# Patient Record
Sex: MALE | Race: WHITE
[De-identification: names, ages, dates, MRNs, and addresses within clinical notes are randomized per-mention and may not be internally consistent; named-entity substitution may affect disease eponyms.]

---

## 2018-04-28 ENCOUNTER — HOSPITAL ENCOUNTER (EMERGENCY)
Dept: HOSPITAL 62 - ER | Age: 33
LOS: 1 days | Discharge: HOME | End: 2018-04-29
Payer: OTHER GOVERNMENT

## 2018-04-28 VITALS — SYSTOLIC BLOOD PRESSURE: 128 MMHG | DIASTOLIC BLOOD PRESSURE: 66 MMHG

## 2018-04-28 DIAGNOSIS — M79.606: ICD-10-CM

## 2018-04-28 DIAGNOSIS — R20.2: ICD-10-CM

## 2018-04-28 DIAGNOSIS — S39.92XA: Primary | ICD-10-CM
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
## 2018-04-28 PROCEDURE — 99283 EMERGENCY DEPT VISIT LOW MDM: CPT

## 2018-04-28 PROCEDURE — 72110 X-RAY EXAM L-2 SPINE 4/>VWS: CPT

## 2018-04-28 PROCEDURE — 96372 THER/PROPH/DIAG INJ SC/IM: CPT

## 2018-04-29 NOTE — RADIOLOGY REPORT (SQ)
EXAM DESCRIPTION: 



L SPINE WHOLE



CLINICAL HISTORY: 



32 years, Male, back injury



COMPARISON:

None.



NUMBER OF VIEWS:

5



LIMITATIONS:

None.



Findings: Normal alignment and curvature. Vertebral and

intervertebral heights are maintained. Extraspinal structures are

grossly intact.



IMPRESSION: No acute findings of L SPINE.

.

## 2018-04-29 NOTE — ER DOCUMENT REPORT
ED Neck/Back Problem





- General


Mode of Arrival: Ambulatory


Information source: Patient


TRAVEL OUTSIDE OF THE [place] IN LAST 30 DAYS: No





<DOYLE ROOT - Last Filed: 04/29/18 02:07>





<KANDI RYAN - Last Filed: 04/29/18 03:50>





- General


Chief Complaint: Back Injury


Stated Complaint: BACK INJURY


Time Seen by Provider: 04/28/18 23:14


Notes: 





Patient is a 32-year-old male who presents to the emergency department today 

with complaints of lower back pain.  Patient states he was dead lifting 225 

pounds when his "low back and legs felt like jelly".  Patient states he has 

tingling in bilateral hips and arms.  Patient states she took Tylenol prior to 

arrival which did not relieve his pain.  Patient has no difficulty with 

ambulation.  Patient denies any incontinence. (DOYLE ROOT)





- Related Data


Allergies/Adverse Reactions: 


 





No Known Allergies Allergy (Unverified 04/28/18 21:33)


 











Past Medical History





- General


Information source: Patient





- Social History


Smoking Status: Never Smoker


Cigarette use (# per day): No


Frequency of alcohol use: None


Drug Abuse: None


Lives with: Family


Family History: Reviewed & Not Pertinent





- Medical History


Medical History: Negative


Surgical Hx: Negative





<DOYLE ROOT - Last Filed: 04/29/18 02:07>





Review of Systems





- Review of Systems


Constitutional: No symptoms reported


EENT: No symptoms reported


Cardiovascular: No symptoms reported


Respiratory: No symptoms reported


Gastrointestinal: No symptoms reported


Genitourinary: No symptoms reported


Male Genitourinary: No symptoms reported


Musculoskeletal: See HPI, Back pain


Skin: No symptoms reported


Hematologic/Lymphatic: No symptoms reported


Neurological/Psychological: No symptoms reported


-: Yes All other systems reviewed and negative





<DOYLE ROOT - Last Filed: 04/29/18 02:07>





Physical Exam





- Vital signs


Interpretation: Normal





- General


General appearance: Appears well, Alert





- HEENT


Head: Normocephalic, Atraumatic


Eyes: Normal


Pupils: PERRL





- Respiratory


Respiratory status: No respiratory distress


Chest status: Nontender


Breath sounds: Normal


Chest palpation: Normal





- Cardiovascular


Rhythm: Regular


Heart sounds: Normal auscultation


Murmur: No





- Abdominal


Inspection: Normal


Distension: No distension


Bowel sounds: Normal


Tenderness: Nontender


Organomegaly: No organomegaly





- Back


Back: Normal, Tender - L1-4.  No: Deformity/step-off, CVA tenderness





- Extremities


General upper extremity: Normal inspection, Nontender, Normal color, Normal ROM

, Normal temperature


General lower extremity: Normal inspection, Nontender, Normal color, Normal ROM

, Normal temperature, Normal weight bearing.  No: Jordana's sign





- Neurological


Neuro grossly intact: Yes


Cognition: Normal


Orientation: AAOx4


[place] Coma Scale Eye Opening: Spontaneous


[place] Coma Scale Verbal: Oriented


[place] Coma Scale Motor: Obeys Commands


[place] Coma Scale Total: 15


Speech: Normal


Cerebellar coordination: No: Gait ataxia


Motor strength normal: LUE, RUE, LLE, RLE


Sensory: Normal





- Psychological


Associated symptoms: Normal affect, Normal mood





- Skin


Skin Temperature: Warm


Skin Moisture: Dry


Skin Color: Normal





<KANDI RYAN - Last Filed: 04/29/18 03:50>





- Vital signs


Vitals: 


 











Temp Pulse Resp BP Pulse Ox


 


 98.5 F   65   18   128/66 H  98 


 


 04/28/18 22:02  04/28/18 22:02  04/28/18 22:02  04/28/18 22:02  04/28/18 22:02














Course





<DOYLE ROOT - Last Filed: 04/29/18 02:07>





- Diagnostic Test


Radiology reviewed: Reports reviewed





<KANDI RYAN - Last Filed: 04/29/18 03:50>





- Re-evaluation


Re-evalutation: 





04/29/18 


Patient is a 32-year-old male who is doing dead lifts today and hurt his low 

back.  Patient states that he has been having intermittent paresthesias down 

his legs and around his abdomen.  His also had pain down his legs he has not 

had any bowel or bladder retention or incontinence, weakness.  He has full 

range of motion and strength.  He is able to ambulate.  No acute findings on x-

rays.  I have explained to the patient that it is possible that he has 

herniated a disc and explained the signs that would be concerning for him to 

come back including weakness, bowel or bladder dysfunction, or any other 

concerns.  Patient will be discharged home with Medrol Dosepak, Flexeril, and 

pain medication as needed.  Follow-up with PMD.  Return if further concerns.  

Understands and agrees with plan. (KANDI RYAN)





- Vital Signs


Vital signs: 


 











Temp Pulse Resp BP Pulse Ox


 


 98.5 F   65   18   128/66 H  98 


 


 04/28/18 22:02  04/28/18 22:02  04/28/18 22:02  04/28/18 22:02  04/28/18 22:02














Discharge





<DOYLE ROOT - Last Filed: 04/29/18 02:07>





<KANDI RYAN - Last Filed: 04/29/18 03:50>





- Discharge


Clinical Impression: 


Lower back injury


Qualifiers:


 Encounter type: initial encounter Qualified Code(s): S39.92XA - Unspecified 

injury of lower back, initial encounter





Condition: Stable


Disposition: HOME, SELF-CARE


Instructions:  Herniated Disc ([facility]), Ice Packs ([facility]), Low Back Pain ([facility])


Prescriptions: 


Cyclobenzaprine HCl [Flexeril 10 mg Tablet] 10 mg PO TIDP PRN #15 tab


 PRN Reason: 


Hydrocodone/Acetaminophen [Norco 5-325 mg Tablet] 1 tab PO TIDP PRN #20 tablet


 PRN Reason: 


Lidocaine [Lidoderm 5% (700 mg) Transdermal Patch] 1 patch TP DAILY #20 

adh..patch


Methylprednisolone [Medrol Dosepack (4 mg/Tab) 21 Tab/Dosepak] 4 mg PO ASDIR 

PRN #21 tab.ds.pk


 PRN Reason: 


Forms:  Return to Work





Scribe Documentation





- Scribe


Written by Scribe:: John Esteban, 4/29/2018 [id number]


acting as scribe for :: Sonal





<DOYLE ROOT - Last Filed: 04/29/18 02:07>